# Patient Record
Sex: MALE | Race: WHITE
[De-identification: names, ages, dates, MRNs, and addresses within clinical notes are randomized per-mention and may not be internally consistent; named-entity substitution may affect disease eponyms.]

---

## 2019-07-25 ENCOUNTER — HOSPITAL ENCOUNTER (INPATIENT)
Dept: HOSPITAL 97 - ER | Age: 64
LOS: 1 days | Discharge: HOME | DRG: 291 | End: 2019-07-26
Attending: FAMILY MEDICINE | Admitting: FAMILY MEDICINE
Payer: COMMERCIAL

## 2019-07-25 DIAGNOSIS — J44.9: ICD-10-CM

## 2019-07-25 DIAGNOSIS — I50.21: ICD-10-CM

## 2019-07-25 DIAGNOSIS — Z88.0: ICD-10-CM

## 2019-07-25 DIAGNOSIS — Z87.891: ICD-10-CM

## 2019-07-25 DIAGNOSIS — E11.65: ICD-10-CM

## 2019-07-25 DIAGNOSIS — I35.0: ICD-10-CM

## 2019-07-25 DIAGNOSIS — I11.0: Primary | ICD-10-CM

## 2019-07-25 DIAGNOSIS — I48.91: ICD-10-CM

## 2019-07-25 DIAGNOSIS — E78.2: ICD-10-CM

## 2019-07-25 LAB
ALBUMIN SERPL BCP-MCNC: 3.8 G/DL (ref 3.4–5)
ALP SERPL-CCNC: 75 U/L (ref 45–117)
ALT SERPL W P-5'-P-CCNC: 27 U/L (ref 12–78)
AST SERPL W P-5'-P-CCNC: 14 U/L (ref 15–37)
BUN BLD-MCNC: 17 MG/DL (ref 7–18)
CKMB CREATINE KINASE MB: 1.8 NG/ML (ref 0.3–3.6)
CKMB CREATINE KINASE MB: 1.9 NG/ML (ref 0.3–3.6)
GLUCOSE SERPLBLD-MCNC: 187 MG/DL (ref 74–106)
HCT VFR BLD CALC: 39 % (ref 39.6–49)
INR BLD: 1.07
LYMPHOCYTES # SPEC AUTO: 1.4 K/UL (ref 0.7–4.9)
MAGNESIUM SERPL-MCNC: 2.1 MG/DL (ref 1.8–2.4)
NT-PROBNP SERPL-MCNC: 3183 PG/ML (ref ?–125)
PMV BLD: 10.4 FL (ref 7.6–11.3)
POTASSIUM SERPL-SCNC: 4 MMOL/L (ref 3.5–5.1)
RBC # BLD: 4.13 M/UL (ref 4.33–5.43)
TROPONIN (EMERG DEPT USE ONLY): < 0.02 NG/ML (ref 0–0.04)
TROPONIN I: 0.04 NG/ML (ref 0–0.04)
TROPONIN I: 0.04 NG/ML (ref 0–0.04)
TSH SERPL DL<=0.05 MIU/L-ACNC: 1.96 UIU/ML (ref 0.36–3.74)
UA DIPSTICK W REFLEX MICRO PNL UR: (no result)

## 2019-07-25 PROCEDURE — 71275 CT ANGIOGRAPHY CHEST: CPT

## 2019-07-25 PROCEDURE — 70450 CT HEAD/BRAIN W/O DYE: CPT

## 2019-07-25 PROCEDURE — 82553 CREATINE MB FRACTION: CPT

## 2019-07-25 PROCEDURE — 82550 ASSAY OF CK (CPK): CPT

## 2019-07-25 PROCEDURE — 83690 ASSAY OF LIPASE: CPT

## 2019-07-25 PROCEDURE — 99285 EMERGENCY DEPT VISIT HI MDM: CPT

## 2019-07-25 PROCEDURE — 85610 PROTHROMBIN TIME: CPT

## 2019-07-25 PROCEDURE — 93005 ELECTROCARDIOGRAM TRACING: CPT

## 2019-07-25 PROCEDURE — 80048 BASIC METABOLIC PNL TOTAL CA: CPT

## 2019-07-25 PROCEDURE — 85379 FIBRIN DEGRADATION QUANT: CPT

## 2019-07-25 PROCEDURE — 96372 THER/PROPH/DIAG INJ SC/IM: CPT

## 2019-07-25 PROCEDURE — 83735 ASSAY OF MAGNESIUM: CPT

## 2019-07-25 PROCEDURE — 84443 ASSAY THYROID STIM HORMONE: CPT

## 2019-07-25 PROCEDURE — 71045 X-RAY EXAM CHEST 1 VIEW: CPT

## 2019-07-25 PROCEDURE — 93970 EXTREMITY STUDY: CPT

## 2019-07-25 PROCEDURE — 83880 ASSAY OF NATRIURETIC PEPTIDE: CPT

## 2019-07-25 PROCEDURE — 87088 URINE BACTERIA CULTURE: CPT

## 2019-07-25 PROCEDURE — 85025 COMPLETE CBC W/AUTO DIFF WBC: CPT

## 2019-07-25 PROCEDURE — 87086 URINE CULTURE/COLONY COUNT: CPT

## 2019-07-25 PROCEDURE — 96375 TX/PRO/DX INJ NEW DRUG ADDON: CPT

## 2019-07-25 PROCEDURE — 96374 THER/PROPH/DIAG INJ IV PUSH: CPT

## 2019-07-25 PROCEDURE — 96361 HYDRATE IV INFUSION ADD-ON: CPT

## 2019-07-25 PROCEDURE — 71046 X-RAY EXAM CHEST 2 VIEWS: CPT

## 2019-07-25 PROCEDURE — 82962 GLUCOSE BLOOD TEST: CPT

## 2019-07-25 PROCEDURE — 36415 COLL VENOUS BLD VENIPUNCTURE: CPT

## 2019-07-25 PROCEDURE — 80061 LIPID PANEL: CPT

## 2019-07-25 PROCEDURE — 93306 TTE W/DOPPLER COMPLETE: CPT

## 2019-07-25 PROCEDURE — 80076 HEPATIC FUNCTION PANEL: CPT

## 2019-07-25 PROCEDURE — 81003 URINALYSIS AUTO W/O SCOPE: CPT

## 2019-07-25 PROCEDURE — 84484 ASSAY OF TROPONIN QUANT: CPT

## 2019-07-25 RX ADMIN — HUMAN INSULIN SCH: 100 INJECTION, SOLUTION SUBCUTANEOUS at 21:00

## 2019-07-25 RX ADMIN — HUMAN INSULIN SCH: 100 INJECTION, SOLUTION SUBCUTANEOUS at 12:44

## 2019-07-25 RX ADMIN — METOPROLOL TARTRATE SCH MG: 25 TABLET ORAL at 17:19

## 2019-07-25 RX ADMIN — ALBUMIN (HUMAN) SCH MG: 5 SOLUTION INTRAVENOUS at 16:25

## 2019-07-25 RX ADMIN — Medication SCH ML: at 21:26

## 2019-07-25 RX ADMIN — Medication SCH ML: at 10:43

## 2019-07-25 RX ADMIN — ENOXAPARIN SODIUM SCH MG: 60 INJECTION SUBCUTANEOUS at 21:00

## 2019-07-25 NOTE — ER
Nurse's Notes                                                                                     

 [facility]                                                                 

Name: Sam Romero                                                                           

Age: 63 yrs                                                                                       

Sex: Male                                                                                         

: 1955                                                                                   

MRN: [id number]                                                                                   

Arrival Date: 2019                                                                          

Time: 05:25                                                                                       

Account#: [id number]                                                                            

Bed 5                                                                                             

Private MD:                                                                                       

Diagnosis: Atrial fibrillation and flutter-with rvr;Syncope and collapse-near;Type 2 diabetes     

  mellitus;Cardiomegaly;Unspecified combined systolic (congestive) and diastolic                  

  (congestive) heart failure                                                                      

                                                                                                  

Presentation:                                                                                     

                                                                                             

05:10 Presenting complaint: Patient states: that he woke up and had his morning coffee like     

      normal. He then suddenly became dizzy and just didn't feel well. Also having nausea and     

      vomiting. Does state that for the past month he has been having issues with shortness       

      of breath and no energy. Has appt next week to see Dr Gomez. Transition of care:          

      patient was not received from another setting of care. Onset of symptoms was 2019 at 04:00. Risk Assessment: Do you want to hurt yourself or someone else? Patient       

      reports no desire to harm self or others. Initial Sepsis Screen: Does the patient meet      

      any 2 criteria? HR > 90 bpm. Yes Does the patient have a suspected source of infection?     

      No. Patient's initial sepsis screen is negative. Care prior to arrival: IV initiated.       

      18 GA, in the right antecubital area.                                                       

05:10 Method Of Arrival: EMS: [facility]  

05:10 Acuity: JESSI 3                                                                           fc  

                                                                                                  

Historical:                                                                                       

- Allergies:                                                                                      

05:38 PENICILLINS;                                                                            fc  

- Home Meds:                                                                                      

05:38 metformin 1,000 mg oral tab 1 tab 2 times per day [Active]; Januvia 50 mg oral tab 1    fc  

      tabs once daily [Active]; lovastatin 40 mg Oral tab 1 tab once daily [Active];              

- PMHx:                                                                                           

05:38 Diabetes - NIDDM; High Cholesterol;                                                     fc  

- PSHx:                                                                                           

05:38 None;                                                                                   fc  

                                                                                                  

- Immunization history:: Last tetanus immunization: unknown.                                      

- Social history:: Smoking status: Patient/guardian denies using tobacco,                         

  Patient/guardian denies using alcohol, street drugs.                                            

- Ebola Screening: : Patient negative for fever greater than or equal to 101.5 degrees            

  Fahrenheit, and additional compatible Ebola Virus Disease symptoms Patient denies               

  exposure to infectious person Patient denies travel to an Ebola-affected area in the            

  21 days before illness onset.                                                                   

- Family history:: not pertinent.                                                                 

                                                                                                  

                                                                                                  

Screenin:10 Abuse screen: Denies threats or abuse. Nutritional screening: No deficits noted.        fc  

      Tuberculosis screening: No symptoms or risk factors identified. Fall Risk None              

      identified.                                                                                 

                                                                                                  

Assessment:                                                                                       

05:33 General: Appears in no apparent distress. Behavior is calm, cooperative. Pain: Denies   bb  

      pain. Neuro: Level of Consciousness is awake, alert, obeys commands, Oriented to            

      person, place, time, situation. Cardiovascular: Heart tones S1 S2 present Capillary         

      refill < 3 seconds Patient's skin is warm and dry. Pulses are all present. Edema is         

      absent. Respiratory: Respiratory effort is even, unlabored, Respiratory pattern is          

      regular, symmetrical, Breath sounds are clear bilaterally. GI: Abdomen is                   

      non-distended, Bowel sounds present X 4 quads. Abd is soft and non tender X 4 quads.        

      Derm: Skin is pink, warm \T\ dry. Musculoskeletal: Circulation, motion, and sensation       

      intact.                                                                                     

06:04 Reassessment: pt to CT scan via stretcher with CT tech.                                 bb  

06:28 Reassessment: Patient is alert, oriented x 3, equal unlabored respirations, skin        bb  

      warm/dry/pink. IV site intact, patent, with fluids infusing, awaiting diagnostic            

      results, family at bedside. Pt states dizziness only lasted about 5 minutes.                

06:54 Reassessment: pt returned from CT via stretcher with CT tech. Pt is A\T\O x 4, resp       bb

      unlabored, US tech now at bedside.                                                          

06:56 Reassessment: pt is vomiting Dr Conteh notified.                                      bb  

07:42 Reassessment: Patient appears in no apparent distress at this time. Patient and/or      tw2 

      family updated on plan of care and expected duration. Pain level reassessed. Patient is     

      alert, oriented x 3, equal unlabored respirations, skin warm/dry/pink. Patient states       

      feeling better.                                                                             

08:42 Reassessment: Patient appears in no apparent distress at this time. Patient and/or      tw2 

      family updated on plan of care and expected duration. Pain level reassessed. Patient is     

      alert, oriented x 3, equal unlabored respirations, skin warm/dry/pink.                      

09:45 Reassessment: Patient appears in no apparent distress at this time. Patient and/or      tw2 

      family updated on plan of care and expected duration. Pain level reassessed. Patient is     

      alert, oriented x 3, equal unlabored respirations, skin warm/dry/pink.                      

10:14 Reassessment: SEE [facility] CHARTING AS PT IS ER HOLD AT THIS TIME.                      tw2 

                                                                                                  

Vital Signs:                                                                                      

05:10  / 82; Pulse 116; Resp 16; Temp 98.4(O); Pulse Ox 93% on R/A; Weight 102.06 kg    fc  

      (R); Height 5 ft. 9 in. (175.26 cm) (R); Pain 0/10;                                         

06:30  / 64; Pulse 75; Resp 14 S; Pulse Ox 95% on R/A;                                  bb  

07:52  / 82; Pulse 69; Resp 16; Pulse Ox 99% ;                                          sv  

08:41  / 72; Pulse 63; Resp 17; Pulse Ox 100% on R/A;                                   tw2 

09:45  / 71; Pulse 60; Resp 17; Pulse Ox 99% on R/A; Pain 0/10;                         tw2 

05:10 Body Mass Index 33.23 (102.06 kg, 175.26 cm)                                              

                                                                                                  

ED Course:                                                                                        

05:10 Arm band placed on Patient placed in an exam room, on a stretcher.                        

05:10 Patient has correct armband on for positive identification. Bed in low position. Call   fc  

      light in reach. Side rails up X2. Cardiac monitor on. Pulse ox on. NIBP on.                 

05:10 No provider procedures requiring assistance completed. Maintain EMS IV. Dressing        fc  

      intact. Good blood return noted. Site clean \T\ dry. Gauge \T\ site: 18 gauge to right a/c. 

05:18 EKG done, by ED staff, reviewed by Bunny Conteh MD.                                   fc  

05:25 Patient arrived in ED.                                                                  nader 

05:25 Initial lab(s) drawn, by me, sent to lab.                                               fc  

05:27 Bunny Conteh MD is Attending Physician.                                             nader 

05:31 Triage completed.                                                                       fc  

05:58 Mark Reaves DO is Hospitalizing Provider.                                           nader 

06:11 CT completed. Patient tolerated procedure well. Patient moved to CT via stretcher.      eh  

      Patient moved back from CT.                                                                 

06:13 XRAY Chest (1 view) In Process Unspecified.                                             EDMS

06:20 CT Head Brain wo Cont In Process Unspecified.                                           EDMS

07:50 Basic Metabolic Panel Sent.                                                             sv  

07:50 CBC with Diff Sent.                                                                     sv  

07:50 LFT's Sent.                                                                             sv  

07:50 Magnesium Sent.                                                                         sv  

07:50 NT PRO-BNP Sent.                                                                        sv  

07:52 Awaiting radiology results.                                                             sv  

07:58 Vielka Ren, RN is Primary Nurse.                                                        tw2 

08:15 Echocardiogram with doppler done by Echo tech.                                          tc  

08:56 CT Chest For PE Angio Sent.                                                             sv  

08:56 Echo w/ Doppler Sent.                                                                   sv  

08:56 US Extremity Venous W Compression David Sent.                                             sv  

10:15 Patient admitted, IV remains in place.                                                  tw2 

                                                                                                  

Administered Medications:                                                                         

      Discontinued: NS 0.9% 1000 ml IV at 1 bolus Per protocol; 1000 mL bolus                     

05:49 Drug: NS 0.9% 1000 ml Route: IV; Rate: 1 bolus; Site: right antecubital;                bb  

06:55 Follow up: IV Status: Order to discontinue infusion                                     tw2 

05:53 Drug: Lopressor 25 mg Route: PO;                                                        bb  

07:06 Follow up: Response: No adverse reaction                                                bb  

06:59 CANCELLED (Duplicate Order): Zofran 4 mg IVP once; over 2 minutes                       nader 

07:05 Drug: Phenergan 12.5 mg Route: IVP; Site: right antecubital;                            bb  

07:48 Follow up: Response: No adverse reaction; Nausea is decreased                           sv  

07:06 Drug: Aspirin 162 mg Route: PO;                                                         bb  

07:48 Follow up: Response: No adverse reaction                                                sv  

07:06 Drug: Lovenox 1 mg/kg Route: Sub-Q; Site: abdomen;                                      bb  

07:48 Follow up: Response: No adverse reaction                                                sv  

08:04 Drug: Lasix 20 mg Route: IVP; Site: right antecubital;                                  tw2 

08:54 Follow up: Response: No adverse reaction                                                sv  

                                                                                                  

                                                                                                  

Point of Care Testing:                                                                            

      Blood Glucose:                                                                              

05:28 Blood Glucose: 217 mg/dL;                                                               fc  

      Ranges:                                                                                     

                                                                                                  

Outcome:                                                                                          

05:59 Decision to Hospitalize by Provider.                                                    nader 

10:14 Admitted to ER Hold.  Please see [facility] for further documentation.                    tw2 

10:14 Condition: stable                                                                           

10:14 Instructed on the need for admit.                                                           

13:37 Patient left the ED.                                                                    sv  

                                                                                                  

Signatures:                                                                                       

Dispatcher [employer]                           Johanna Hawkins RN                    RN   sv                                                   

Bunny Conteh MD MD cha Hagler, Ervin                                                                                   

Tania Gonzales RN RN fc Ballard, Brenda, RN                     RN   bb                                                   

Yesy Galvan, EKG Tech               EKG Ttc                                                   

Vielka Ren, JAZZ                          RN   tw2                                                  

                                                                                                  

**************************************************************************************************

## 2019-07-25 NOTE — PN
Date of Progress Note:  07/25/2019



Subjective:  Patient seen and examined.  Chart reviewed and case discussed with 
RN and Dr. Gar.  Patient states his shortness of breath has improved.  The 
dizziness is better as well.  The patient is back in sinus rhythm.



Medications:  List reviewed.



Physical Examination:

Vital Signs:  Pulse is 63, blood pressure 110/72, respirations 17, O2 of 100% 
on room air. 

General:  Awake, alert, and oriented x3.  Elderly male. 

CV:  S1, S2.  Regular rate and rhythm.  The patient does have murmur systolic. 

Respiratory:  Clear to auscultation bilaterally.  No wheezing or stridor. 

Gastrointestinal:  Abdomen is soft, nontender, nondistended.  Positive bowel 
sounds.  No guarding or rigidity. 

Extremities:  No clubbing, cyanosis, or edema.  No calf tenderness. 

Neuro:  Cranial nerves 2 through 12 intact grossly.  No focal neurological 
deficit.  Speech is normal. 

Skin:  No rashes.  Normal skin turgor.



Laboratory Data:  WBC 10.4, H and H 12.9 and 39.  Troponin less than 0.02.  
Magnesium 2.1.  UA is negative.  Echocardiogram shows EF of 30%.  Severe global 
hypokinesis, moderate aortic stenosis 1.3 cm2.  Left ventricular dilation, no 
effusion.  Doppler venous study, no DVT in either lower extremity.  CT angio 
chest shows no pulmonary emboli.  The patient has varying posterior pleural 
effusions, partial atelectasis of each lower lobe, mild failure pattern.  Head 
CT scan negative for any acute abnormalities.



Assessment And Plan:  A 63-year-old male with:

1.   New onset atrial fibrillation with rapid ventricular response, now back in 
sinus rhythm, improved with metoprolol.  Continue with Lovenox.  Appreciate Dr. Gar's input.  He recommends cardiac catheterization.  The patient has 
severe global hypokinesis and aortic stenosis, depressed ejection fraction of 30
%.

2.   Acute congestive heart failure, systolic dysfunction, new onset chest x-
ray and CT angio show pleural effusions and heart failure pattern.  We will 
start on diuretics and continue to monitor I's and O's.  Start on free fluid 
restriction and sodium restriction.

3.   Diabetes mellitus type 2 non-insulin dependent with hyperglycemia.  We 
will continue with sliding scale insulin, monitor Accu-Cheks.

4.   Mixed hyperlipidemia.  We will continue statin.

5.   Chronic obstructive pulmonary disease.  Continue with breathing treatments 
as needed.

6.   Essential hypertension.  Continue on beta-blocker.

7.   Pleural effusion bilateral. Appear small. No need for thoracentesis. 
Secondary to CHF. Continue diuresis. 

7.   Deep vein thrombosis prophylaxis.  Patient is already on Lovenox. 



Plan:  Patient will need an outpatient workup including pulmonary function 
testing and will need to follow closely with Cardiology for his new onset heart 
failure and atrial fibrillation.  We will transition to oral anticoagulants 
upon discharge.





/THAI

DD:  07/25/2019 14:50:21   Voice ID:  [id number]

DT:  07/25/2019 16:58:03   Report ID:  [id number]

DEQUAN

## 2019-07-25 NOTE — XMS REPORT
Patient Summary Document

 Created on:2019



Patient:YAMILE PEOPLES

Sex:Male

:1955

External Reference #:[id number]





Demographics







 Address  [address] [place] [address]

 

 Home Phone  [phone]

 

 Work Phone  [phone]

 

 Email Address  [email or web address]

 

 Preferred Language  Unknown

 

 Marital Status  Unknown

 

 Hoahaoism Affiliation  Unknown

 

 Race  Unknown

 

 Additional Race(s)  Unavailable

 

 Ethnic Group  Unknown









Author







 Organization  [facility]

 

 Address  [address]



   [place] [address]

 

 Phone  [phone]









Care Team Providers







 Name  Role  Phone

 

 SADA DAVIDSON  Unavailable  Unavailable









Problems

This patient has no known problems.



Allergies, Adverse Reactions, Alerts

This patient has no known allergies or adverse reactions.



Medications

This patient has no known medications.



Results







 Test Description  Test Time  Test Comments  Text Results  Atomic Results  
Result Comments









 TISSUE EXAM  2018 19:30:00    Surgical Pathology Report  



       Case: [id number]  



       Authorizing Provider:  aSda Davidson MD  



       Collected:           2018 1538  



       Ordering Location:     [facility]  



       Received:            2018 0839  



                            Services  



         



       Pathologist:           Soren Nation MD  



         



       Specimens:   A) - Polyp, Colon - Right/Ascending,  



       hot snare  



                  B) - Polyp, Colon - Rectum, POLYPS X2,  



       TAKEN BY EMR/HOT SNARE                        A.  



       COLON, RIGHT/ASCENDING, POLYPECTOMY-  



       NONDYSPLASTIC COLON MUCOSA WITH NO SIGNIFICANT  



       DIAGNOSTIC ALTERATIONSB. RECTUM, POLYPECTOMY-  



       SESSILE SERRATED POLYP- HYPERPLASTIC POLYP-  



       SEPARATE FRAGMENT OF RECTAL MUCOSA WITH NO  



       SIGNIFICANT DIAGNOSTIC ALTERATIONS      Signing  



       Pathologist Direct Phone Line:  



       [phone] signed by Soren Nation MD on 3/1/2018 at  7:30 [id number]  



       [id number] colonic polypA. Right/ascending  



       colon colon polyp. B. Rectum polypSpecimen A:  



       Received in formalin labeled "polyp, colon  



       right/ascending" is a 0.7 x 0.5 x 0.2 cm,  



       pink-tan, irregular fragment of soft tissue. The  



       specimen is bisected and entirely submitted in  



       cassette A1.Specimen B: Received in formalin  



       labeled "polyp, colon rectum" are three  



       irregular, pink-tan, polypoid fragments of soft  



       tissue measuring 1.5 x 1.2 x 0.3 cm in aggregate.  



       The largest fragment is bisected, and the  



       specimen is entirely submitted in B1. DB/ew A.  



       Microscopic examination is performed and the  



       findings are incorporated in the diagnostic line.  



       B. There is no cytologic atypia.  









 POCT-GLUCOSE METER  2018 11:41:00    









   Test Item  Value  Reference Range  Comments









 POC-GLUCOSE METER (MARY) (test  177 mg/dL    TESTED AT [facility] [address]



 [id number])      [facility] [address]

## 2019-07-25 NOTE — XMS REPORT
Clinical Summary

 Created on:2019



Patient:Sam Romero

Sex:Male

:1955

External Reference #:[id number]





Demographics







 Address  [address] [place] [address]

 

 Home Phone  [phone]

 

 Email Address  [email or web address]

 

 Preferred Language  English

 

 Marital Status  Unknown

 

 Advent Affiliation  Unknown

 

 Race  White

 

 Ethnic Group  Not  or 









Author







 Organization  [facility]

 

 Address  [address] [place] [address]









Support







 Name  Relationship  Address  Phone

 

 Cindy Romero  Unavailable  Unavailable  [phone]









Care Team Providers







 Name  Role  Phone

 

 Lalo Alva MD  Primary Care Provider  [phone]









Allergies







 Active Allergy  Reactions  Severity  Noted Date  Comments

 

 Penicillins  Other (See Comments)    2018  As a child







Medications







 Medication  Sig  Dispensed  Refills  Start Date  End Date  Status

 

 metFORMIN (GLUCOPHAGE)  Take 1,000 mg by    0      Active



 1000 MG tablet  mouth 2 (two)          



   times daily with          



   breakfast and          



   dinner.          

 

 SITagliptin (JANUVIA)  Take 50 mg by    0      Active



 50 MG tablet  mouth daily.          

 

 lisinopril  Take 5 mg by    0      Active



 (PRINIVIL,ZESTRIL) 5 MG  mouth daily.          



 tablet            

 

 lovastatin (MEVACOR) 40  Take 40 mg by    0      Active



 MG tablet  mouth nightly.          

 

 magnesium oxide  Take 400 mg by    0      Active



 (MAG-OX) 400 mg tablet  mouth daily.          

 

 cinnamon bark 500 mg  Take 500 mg by    0      Active



 capsule  mouth daily.          

 

 cholecalciferol  Take 1,000 Units    0      Active



 (VITAMIN D3) 1,000 unit  by mouth daily.          



 tablet            







Active Problems

Not on file



Social History







 Tobacco Use  Types  Packs/Day  Years Used  Date

 

 Former Smoker        









 Smokeless Tobacco: Never Used      









 Alcohol Use  Drinks/Week  oz/Week  Comments

 

 No      









 Sex Assigned at Birth  Date Recorded

 

 Not on file  









 Job Start Date  Occupation  Industry

 

 Not on file  Not on file  Not on file









 Travel History  Travel Start  Travel End









 No recent travel history available.







Last Filed Vital Signs

Not on file



Plan of Treatment

Not on file



Results

Not on fileafter 2018



Insurance







 Payer  Benefit Plan /  Subscriber ID  Type  Phone  Address



   Group        

 

 BLUE CROSS/BLUE  [employer]  xxxxxxxxxxxxxxx  [employer]  [phone]  [address]







 [employer]  [place]



           [address]









 Guarantor Name  Account Type  Relation to  Date of  Phone  Billing



     Patient  Birth    Address

 

 Sam Romero  Personal/Family  Self  1955  [phone] [address]



 [place]        (Home)  [place] [address]

## 2019-07-25 NOTE — RAD REPORT
EXAM DESCRIPTION:  US - Extrem Venous W Compress David - 7/25/2019 7:17 am

 

CLINICAL HISTORY:   Bilateral leg pain

 

COMPARISON:  None.

 

TECHNIQUE:  Real-time sonographic evaluation of the bilateral lower extremity common femoral, superfi
cial femoral, popliteal and posterior tibial veins was performed.

 

FINDINGS:  Normal compressibility, flow augmentation, phasic flow and spontaneous flow are identified
 in the left and right lower extremity common femoral, superficial femoral, popliteal and posterior t
ibial veins. No intraluminal filling defects seen.

 

IMPRESSION:  No DVT in either lower extremity.

## 2019-07-25 NOTE — ECHO
HEIGHT: 5 ft 9 in   WEIGHT: 125 lb 0 oz   DATE OF STUDY: 7/25/19   REFER DR: 
Bunny Conteh MD

2-DIMENSIONAL: YES

     M.MODE: YES

 DOPPLER: YES

COLOR FLOW: YES



                    TDS:  NO

PORTABLE: YES

 DEFINITY:  NO

BUBBLE STUDY: NO





DIAGNOSIS:  CONGESTIVE HEART FAILURE/ ATRIAL FIBRILLATION



CARDIAC HISTORY:  

CATHERIZATION: NO

SURGERY: NO

PROSTHETIC VALVE: NO

PACEMAKER: NO





MEASUREMENTS (cm)

    DIASTOLIC (NORMALS)                 SYSTOLIC (NORMALS)

IVSd                 0.9 (0.6-1.2)                    LA Diam 3.5 (1.9-4.0)     LVEF       
  30%  

LVIDd               6.7 (3.5-5.7)                        LVIDs      5.7 (2.0-3.5)     %FS  
        14%

LVPWd             1.0 (0.6-1.2)

Ao Diam           2.9 (2.0-3.7)



2 DIMENSIONAL ASSESSMENT:

RIGHT ATRIUM:                   NORMAL

LEFT ATRIUM:       NORMAL



RIGHT VENTRICLE:            NORMAL

LEFT VENTRICLE: DILATED



TRICUSPID VALVE:             NORMAL

MITRAL VALVE:     NORMAL



PULMONIC VALVE:             NORMAL

AORTIC VALVE:     STENOTIC 



PERICARDIAL EFFUSION: NONE

AORTIC ROOT:      NORMAL





LEFT VENTRICULAR WALL MOTION:     SEVERE GLOBAL  HYPOKINESIS.



DOPPLER/COLOR FLOW:     MODERATE AORTIC STENOSIS  AREA 1.3 CENTIMETERS SQUARED. PRESSURE 
GRADIENT 20mmHg.



COMMENTS:      SEVERE GLOBAL HYPOKINESIS. MODERATE AORTIC STENOSIS 1.3 CENTIMETERS 
SQAURED.  LEFT VENTRICULAR DILATATION. NO EFFUSION.



TECHNOLOGIST:   RUEL ROBERTSON

## 2019-07-25 NOTE — RAD REPORT
EXAM DESCRIPTION:  CT - Chest For Pe Angio - 7/25/2019 6:58 am

 

CLINICAL HISTORY:   Weakness, shortness of breath

 

COMPARISON:  Portable chest same date

 

TECHNIQUE:  Dynamically enhanced 3 mm thick images of the chest were obtained during administration o
f approximately 150mL Isovue 370 IV contrast. Coronal and oblique MIP reconstruction images were gene
rated and reviewed. Exam utilizes a protocol to evaluate the pulmonary arterial tree.

 

All CT scans are performed using dose optimization technique as appropriate and may include automated
 exposure control or mA/KV adjustment according to patient size.

 

FINDINGS:  No pulmonary emboli are identified.

 

Aorta assessment is limited on a PE protocol study. No aortic aneurysm. There are aortic calcificatio
ns present but no displaced calcification or other finding suspicious for an acute aortic process. No
 pericardial thickening or effusion. Heart size is upper normal.

 

Small to moderate bilateral layering pleural effusions are present posteriorly. No suspicion for locu
lation. No focal consolidations seen. Partial atelectasis seen in each lower lobe. Overall interstiti
al markings are prominent and the patient could have a mild interstitial edema or infiltrate pattern.
 Early failure would be a consideration. No pneumothorax. No pleural based mass.

 

A few small nonspecific or reactive type mediastinal and hilar lymph nodes are present. No suspicious
 mediastinal process identifiable. No chest wall masses or abnormal axillary lymphadenopathy.

 

Prominent thoracic spine degenerative changes.

 

IMPRESSION:  No pulmonary emboli identified.

 

Small to moderate sized layering posterior pleural effusions. There is minimal partial atelectasis of
 each lower lobe.

 

Prominent interstitial pattern suggesting interstitial edema or infiltrate. Patient may have mild sudheer
lure.

## 2019-07-25 NOTE — EKG
Test Date:    2019-07-25               Test Time:    05:49:51

Technician:   ARTURO                                     

                                                     

MEASUREMENT RESULTS:                                       

Intervals:                                           

Rate:         74                                     

PA:           196                                    

QRSD:         110                                    

QT:           416                                    

QTc:          461                                    

Axis:                                                

P:            59                                     

PA:           196                                    

QRS:          52                                     

T:            84                                     

                                                     

INTERPRETIVE STATEMENTS:                                       

                                                     

Normal sinus rhythm

Low voltage QRS

Incomplete left bundle branch block

T wave abnormality, consider anterior ischemia

Abnormal ECG

Compared to ECG 07/25/2019 05:18:31

Left bundle-branch block now present

T-wave abnormality now present

Possible ischemia now present

Sinus tachycardia no longer present

Fusion complex(es) no longer present

Ventricular premature complex(es) no longer present

First degree AV block no longer present

Myocardial infarct finding no longer present



Electronically Signed On 07-25-19 11:20:57 CDT by John Gar

## 2019-07-25 NOTE — EDPHYS
Physician Documentation                                                                           

 [facility]                                                                 

Name: Sma Romero                                                                           

Age: 63 yrs                                                                                       

Sex: Male                                                                                         

: 1955                                                                                   

MRN: [id number]                                                                                   

Arrival Date: 2019                                                                          

Time: 05:25                                                                                       

Account#: [id number]                                                                            

Bed 5                                                                                             

Private MD:                                                                                       

ED Physician Bunny Conteh                                                                      

HPI:                                                                                              

                                                                                             

05:36 This 63 yrs old  Male presents to ER via EMS with complaints of dizzy and      nader 

      weakness.                                                                                   

05:36 The patient has experienced near-syncope, almost passed out, felt generally weak.       nader 

      Onset: The symptoms/episode began/occurred just prior to arrival, this morning.             

      Duration: This was a single episode, that lasted 3 minute(s). The patient presents with     

      dizziness, feeling faint, generalized weakness, lightheadedness. Onset: The                 

      symptoms/episode began/occurred. Context: occurred at home, occurred while the patient      

      was sitting. Modifying factors: The symptoms are alleviated by nothing, the symptoms        

      are aggravated by nothing. Associated signs and symptoms: Pertinent positives: nausea,      

      shortness of breath, vomiting.                                                              

                                                                                                  

Historical:                                                                                       

- Allergies:                                                                                      

05:38 PENICILLINS;                                                                            fc  

- Home Meds:                                                                                      

05:38 metformin 1,000 mg oral tab 1 tab 2 times per day [Active]; Januvia 50 mg oral tab 1    fc  

      tabs once daily [Active]; lovastatin 40 mg Oral tab 1 tab once daily [Active];              

- PMHx:                                                                                           

05:38 Diabetes - NIDDM; High Cholesterol;                                                     fc  

- PSHx:                                                                                           

05:38 None;                                                                                   fc  

                                                                                                  

- Immunization history:: Last tetanus immunization: unknown.                                      

- Social history:: Smoking status: Patient/guardian denies using tobacco,                         

  Patient/guardian denies using alcohol, street drugs.                                            

- Ebola Screening: : Patient negative for fever greater than or equal to 101.5 degrees            

  Fahrenheit, and additional compatible Ebola Virus Disease symptoms Patient denies               

  exposure to infectious person Patient denies travel to an Ebola-affected area in the            

  21 days before illness onset.                                                                   

- Family history:: not pertinent.                                                                 

                                                                                                  

                                                                                                  

ROS:                                                                                              

05:36 Constitutional: Negative for fever, chills, and weight loss, Eyes: Negative for injury, nader 

      pain, redness, and discharge, ENT: Negative for injury, pain, and discharge, Neck:          

      Negative for injury, pain, and swelling, Back: Negative for injury and pain, :            

      Negative for injury, bleeding, discharge, and swelling, MS/Extremity: Negative for          

      injury and deformity, Skin: Negative for injury, rash, and discoloration, Psych:            

      Negative for depression, anxiety, suicide ideation, homicidal ideation, and                 

      hallucinations, Allergy/Immunology: Negative for hives, rash, and allergies, Endocrine:     

      Negative for neck swelling, polydipsia, polyuria, polyphagia, and marked weight             

      changes, Hematologic/Lymphatic: Negative for swollen nodes, abnormal bleeding, and          

      unusual bruising.                                                                           

05:36 Cardiovascular: Positive for palpitations.                                                  

05:36 Respiratory: Positive for cough, shortness of breath.                                       

05:36 Neuro: Positive for dizziness, near syncope.                                                

                                                                                                  

Exam:                                                                                             

05:36 Constitutional:  This is a well developed, well nourished patient who is awake, alert,  nader 

      and in no acute distress. Head/Face:  Normocephalic, atraumatic. Eyes:  Pupils equal        

      round and reactive to light, extra-ocular motions intact.  Lids and lashes normal.          

      Conjunctiva and sclera are non-icteric and not injected.  Cornea within normal limits.      

      Periorbital areas with no swelling, redness, or edema. ENT:  Nares patent. No nasal         

      discharge, no septal abnormalities noted.  Tympanic membranes are normal and external       

      auditory canals are clear.  Oropharynx with no redness, swelling, or masses, exudates,      

      or evidence of obstruction, uvula midline.  Mucous membranes moist. Neck:  Trachea          

      midline, no thyromegaly or masses palpated, and no cervical lymphadenopathy.  Supple,       

      full range of motion without nuchal rigidity, or vertebral point tenderness.  No            

      Meningismus. Chest/axilla:  Normal chest wall appearance and motion.  Nontender with no     

      deformity.  No lesions are appreciated. Abdomen/GI:  Soft, non-tender, with normal          

      bowel sounds.  No distension or tympany.  No guarding or rebound.  No evidence of           

      tenderness throughout. Back:  No spinal tenderness.  No costovertebral tenderness.          

      Full range of motion. Male :  Normal genitalia with no discharge or lesions. Skin:        

      Warm, dry with normal turgor.  Normal color with no rashes, no lesions, and no evidence     

      of cellulitis. MS/ Extremity:  Pulses equal, no cyanosis.  Neurovascular intact.  Full,     

      normal range of motion. Neuro:  Awake and alert, GCS 15, oriented to person, place,         

      time, and situation.  Cranial nerves II-XII grossly intact.  Motor strength 5/5 in all      

      extremities.  Sensory grossly intact.  Cerebellar exam normal.  Normal gait. Psych:         

      Awake, alert, with orientation to person, place and time.  Behavior, mood, and affect       

      are within normal limits.                                                                   

05:36 Cardiovascular: Rate: tachycardic, Rhythm: regular, Pulses: Pulses are 4+ in bilateral      

      radial, brachial, femoral, popliteal, posterior tibial and and dorsalis pedis               

      arteries.. Heart sounds: normal, Edema: is not appreciated, JVD: is not appreciated.        

05:41 Musculoskeletal/extremity: DVT Exam: No signs of deep vein thrombosis. no pain, no      nader 

      swelling, no tenderness, negative Homans' sign noted on exam, no appreciated bluish         

      discoloration, no erythema, no increased warmth.                                            

                                                                                                  

Vital Signs:                                                                                      

05:10  / 82; Pulse 116; Resp 16; Temp 98.4(O); Pulse Ox 93% on R/A; Weight 102.06 kg    fc  

      (R); Height 5 ft. 9 in. (175.26 cm) (R); Pain 0/10;                                         

06:30  / 64; Pulse 75; Resp 14 S; Pulse Ox 95% on R/A;                                  bb  

07:52  / 82; Pulse 69; Resp 16; Pulse Ox 99% ;                                          sv  

08:41  / 72; Pulse 63; Resp 17; Pulse Ox 100% on R/A;                                   tw2 

09:45  / 71; Pulse 60; Resp 17; Pulse Ox 99% on R/A; Pain 0/10;                         tw2 

05:10 Body Mass Index 33.23 (102.06 kg, 175.26 cm)                                              

                                                                                                  

MDM:                                                                                              

05:27 Patient medically screened.                                                             [facility] 

05:40 Data reviewed: vital signs, nurses notes, lab test result(s), EKG, radiologic studies,  [facility] 

      plain films.                                                                                

                                                                                                  

                                                                                             

05:35 Order name: Basic Metabolic Panel                                                       [facility] 

                                                                                             

05:35 Order name: CBC with Diff                                                               [facility] 

                                                                                             

05:35 Order name: LFT's                                                                       [facility] 

                                                                                             

05:35 Order name: Magnesium                                                                   [facility] 

                                                                                             

05:35 Order name: NT PRO-BNP                                                                  [facility] 

                                                                                             

05:35 Order name: PT-INR; Complete Time: 06:19                                                [facility] 

                                                                                             

05:35 Order name: Troponin (emerg Dept Use Only); Complete Time: 06:52                        [facility] 

                                                                                             

05:35 Order name: TSH; Complete Time: 06:52                                                   [facility] 

                                                                                             

05:35 Order name: Lipase; Complete Time: 06:52                                                [facility] 

                                                                                             

05:35 Order name: Urine Culture                                                               [facility] 

                                                                                             

05:35 Order name: D-Dimer; Complete Time: 06:19                                               [facility] 

                                                                                             

05:37 Order name: Basic Metabolic Panel; Complete Time: 06:52                                 [facility]

                                                                                             

05:37 Order name: CBC with Automated Diff; Complete Time: 06:19                               [facility]

                                                                                             

05:37 Order name: Liver (Hepatic) Function; Complete Time: 06:52                              [facility]

                                                                                             

05:35 Order name: XRAY Chest (1 view)                                                         [facility] 

                                                                                             

05:35 Order name: CT Head Brain wo Cont; Complete Time: 07:57                                 [facility] 

                                                                                             

05:37 Order name: Magnesium; Complete Time: 06:52                                             [facility]

                                                                                             

05:37 Order name: NT PRO-BNP; Complete Time: 06:52                                            [facility]

                                                                                             

06:18 Order name: US Extremity Venous W Compression David                                       [facility] 

                                                                                             

06:18 Order name: CT Chest For PE Angio                                                       [facility] 

                                                                                             

06:54 Order name: Echo w/ Doppler                                                             [facility] 

                                                                                             

07:55 Order name: CT; Complete Time: 07:57                                                    [facility]

                                                                                             

08:03 Order name: US                                                                          [facility]

                                                                                             

09:20 Order name: Urine Dipstick--Ancillary (enter results)                                     

                                                                                             

10:15 Order name: Urine Dipstick-Ancillary                                                    [facility]

                                                                                             

05:35 Order name: EKG; Complete Time: 05:38                                                   [facility] 

                                                                                             

05:35 Order name: Cardiac monitoring; Complete Time: 05:39                                    [facility] 

                                                                                             

05:35 Order name: EKG - Nurse/Tech; Complete Time: 05:39                                      [facility] 

                                                                                             

05:35 Order name: IV Saline Lock; Complete Time: 05:39                                        [facility] 

                                                                                             

05:35 Order name: Labs collected and sent; Complete Time: 05:40                               [facility] 

                                                                                             

05:35 Order name: O2 Per Protocol; Complete Time: 05:40                                       [facility] 

                                                                                             

05:35 Order name: O2 Sat Monitoring; Complete Time: 05:40                                     [facility] 

                                                                                             

05:35 Order name: Urine Dipstick-Ancillary (obtain specimen); Complete Time: 10:15            [facility] 

                                                                                                  

Administered Medications:                                                                         

      Discontinued: NS 0.9% 1000 ml IV at 1 bolus Per protocol; 1000 mL bolus                     

05:49 Drug: NS 0.9% 1000 ml Route: IV; Rate: 1 bolus; Site: right antecubital;                bb  

06:55 Follow up: IV Status: Order to discontinue infusion                                     tw2 

05:53 Drug: Lopressor 25 mg Route: PO;                                                        bb  

07:06 Follow up: Response: No adverse reaction                                                bb  

06:59 CANCELLED (Duplicate Order): Zofran 4 mg IVP once; over 2 minutes                       nader 

07:05 Drug: Phenergan 12.5 mg Route: IVP; Site: right antecubital;                            bb  

07:48 Follow up: Response: No adverse reaction; Nausea is decreased                           sv  

07:06 Drug: Aspirin 162 mg Route: PO;                                                         bb  

07:48 Follow up: Response: No adverse reaction                                                sv  

07:06 Drug: Lovenox 1 mg/kg Route: Sub-Q; Site: abdomen;                                      bb  

07:48 Follow up: Response: No adverse reaction                                                sv  

08:04 Drug: Lasix 20 mg Route: IVP; Site: right antecubital;                                  tw2 

08:54 Follow up: Response: No adverse reaction                                                sv  

                                                                                                  

                                                                                                  

Point of Care Testing:                                                                            

      Blood Glucose:                                                                              

05:28 Blood Glucose: 217 mg/dL;                                                               fc  

      Ranges:                                                                                     

      Critical Glucose Levels:Adult <50 mg/dl or >400 mg/dl  <40 mg/dl or >180 mg/dl       

Disposition:                                                                                      

19 05:59 Hospitalization ordered by Mark Reaves for Inpatient Admission. Preliminary     

  diagnosis are Atrial fibrillation and flutter - with rvr, Syncope and                           

  collapse - near, Type 2 diabetes mellitus, Cardiomegaly, Unspecified combined                   

  systolic (congestive) and diastolic (congestive) heart failure.                                 

- Bed requested for Telemetry/MedSurg (Inpatient).                                                

- Status is Inpatient Admission.                                                              sv  

- Condition is Fair.                                                                              

- Problem is new.                                                                                 

- Symptoms have improved.                                                                         

UTI on Admission? No                                                                              

                                                                                                  

                                                                                                  

                                                                                                  

Signatures:                                                                                       

Dispatcher [employer]                           Johanna Hawkins RN                    RN   Bunny Allen MD MD cha Chretien, Felicia RN                   JAZZ                                                      

Vida Velez RN RN bb Wise, Tara, RN                          RN   tw2                                                  

Rhiannon Lewis                                                   

                                                                                                  

Corrections: (The following items were deleted from the chart)                                    

06:56 05:59 Hospitalization Ordered by Mark Reaves DO for Inpatient Admission. Preliminary  nader 

      diagnosis is Atrial fibrillation and flutter - with rvr; Syncope and collapse - near;       

      Type 2 diabetes mellitus. Bed requested for Telemetry/MedSurg (Inpatient). Status is        

      Inpatient Admission. Condition is Fair. Problem is new. Symptoms have improved. UTI on      

      Admission? No. nader                                                                          

06:59 06:59 Zofran 4 mg IVP once; over 2 minutes ordered. [facility] 

07:58 06:56 2019 05:59 Hospitalization Ordered by Mark Reaves DO for Inpatient        nader 

      Admission. Preliminary diagnosis is Atrial fibrillation and flutter - with rvr; Syncope     

      and collapse - near; Type 2 diabetes mellitus; Cardiomegaly. Bed requested for              

      Telemetry/MedSurg (Inpatient). Status is Inpatient Admission. Condition is Fair.            

      Problem is new. Symptoms have improved. UTI on Admission? No. nader                           

09:51 07:58 2019 05:59 Hospitalization Ordered by Mark Reaves DO for Inpatient        eb  

      Admission. Preliminary diagnosis is Atrial fibrillation and flutter - with rvr; Syncope     

      and collapse - near; Type 2 diabetes mellitus; Cardiomegaly; Unspecified combined           

      systolic (congestive) and diastolic (congestive) heart failure. Bed requested for           

      Telemetry/MedSurg (Inpatient). Status is Inpatient Admission. Condition is Fair.            

      Problem is new. Symptoms have improved. UTI on Admission? No. nader                           

12:50 09:51 2019 05:59 Hospitalization Ordered by Mark Reaves DO for Inpatient        eb  

      Admission. Preliminary diagnosis is Atrial fibrillation and flutter - with rvr; Syncope     

      and collapse - near; Type 2 diabetes mellitus; Cardiomegaly; Unspecified combined           

      systolic (congestive) and diastolic (congestive) heart failure. Bed requested for [facility]      

      ER HOLD. Status is Inpatient Admission. Condition is Fair. Problem is new. Symptoms         

      have improved. UTI on Admission? No. eb                                                     

13:37 12:50 2019 05:59 Hospitalization Ordered by Mark Reaves DO for Inpatient        sv  

      Admission. Preliminary diagnosis is Atrial fibrillation and flutter - with rvr; Syncope     

      and collapse - near; Type 2 diabetes mellitus; Cardiomegaly; Unspecified combined           

      systolic (congestive) and diastolic (congestive) heart failure. Bed requested for           

      Telemetry/MedSurg (Inpatient). Status is Inpatient Admission. Condition is Fair.            

      Problem is new. Symptoms have improved. UTI on Admission? No. eb                            

                                                                                                  

**************************************************************************************************

## 2019-07-25 NOTE — P.HP
Certification for Inpatient


Patient admitted to: Observation


With expected LOS: <2 Midnights


Patient will require the following post-hospital care: None


Practitioner: I am a practitioner with admitting privileges, knowledge of 

patient current condition, hospital course, and medical plan of care.


Services: Services provided to patient in accordance with Admission 

requirements found in Title 42 Section 412.3 of the Code of Federal Regulations





Patient History


Date of Service: 07/25/19


Primary Care Provider: Dr Alva


Reason for admission: Shortness of breath, dizziness


History of Present Illness: 





63-year-old  male presented to the emergency room with shortness of 

breath and dizziness.  Patient with history of diabetes mellitus type 2, 

hyperlipidemia and tobacco abuse.





Patient has been having shortness of breath over the past month.  He recently 

saw pulmonology to evaluate for COPD.  He is to have a pulmonary function test 

here soon.  Today he was had drinking coffee.  He became dizzy and more short 

of breath.  He denied any significant chest pain. He came to the ER for further 

evaluation.





In the ER he was found to be in atrial fibrillation with rate mildly elevated.  

Patient was given IV fluids in the emergency room.  He converted back to normal 

sinus rhythm. CBC, BMP reviewed.  CT head pending at this time.  Patient was 

admitted for observation to further evaluate.  Patient started on metoprolol.





Patient stable at this time.  Patient reported that he was to see Cardiology 

but his 1st appointment was in August.


Home medications list reviewed: Yes





- Past Medical/Surgical History


Diabetic: Yes


-: Diabetes mellitus type 2, non insulin dependent


-: Hyperlipidemia


-: COPD


-: Former tobacco use


Past Surgical History: Patient denies surgical history


Psychosocial/ Personal History: Patient is 





- Family History


Family History: Reviewed- Non-Contributory





- Social History


Smoking Status: Former smoker


Alcohol use: No


CD- Drugs: No


Caffeine use: Yes


Place of Residence: Home





Review of Systems


General: As per HPI


Eyes: Unremarkable


Respiratory: Shortness of Breath, As per HPI


Cardiovascular: Light Headedness, As per HPI


Gastrointestinal: Unremarkable


Genitourinary: Unremarkable


Musculoskeletal: Unremarkable


Integumentary: Unremarkable


Neurological: Unremarkable


Lymphatics: Unremarkable





Physical Examination





- Physical Exam


General: Alert, In no apparent distress, Oriented x3, Cooperative


HEENT: Atraumatic, Normocephalic, PERRLA, Mucous membr. moist/pink


Neck: Supple, No Thyromegaly


Respiratory: Clear to auscultation bilaterally, Normal air movement


Cardiovascular: Normal pulses, Regular rate/rhythm


Gastrointestinal: Normal bowel sounds, Soft and benign, Non-distended, No 

tenderness, No masses, No rebound, No guarding


Musculoskeletal: No erythema, No tenderness, No warmth


Integumentary: No tenderness/swelling, No erythema, No warmth, No cyanosis


Neurological: Normal speech, Normal strength at 5/5 x4 extr, Normal tone, 

Normal affect





- Studies


Laboratory Data (last 24 hrs)





07/25/19 05:25: Lipase 118


07/25/19 05:25: PT 12.6 H, INR 1.07


07/25/19 05:25: WBC 10.4, Hgb 12.9 L, Hct 39.0 L, Plt Count 228








Assessment and Plan





- Plan





Impression:


New onset atrial fibrillation now in normal sinus rhythm


Diabetes mellitus type 2, non-insulin-dependent


Hypertension


Hyperlipidemia


COPD


Former tobacco use





Plan:


New onset atrial fibrillation now in normal sinus rhythm:  Patient will be 

admitted for further evaluation.  Patient converted to normal sinus rhythm in 

the emergency room.  Patient started on metoprolol.  Will start Lovenox at 1 

milligram/kilogram subcu twice daily.  Echocardiogram ordered.  Will consult 

cardiology to further evaluate and assess.  Patient will likely continue with 

metoprolol and require chronic anti coagulation therapy.  Await further 

recommendations from cardiology.  Anticipate discharge in the next 24 to 48 hr 

pending cardiology evaluation and recommendation.


Diabetes mellitus type 2, non-insulin-dependent:  Will monitor Accu-Cheks.  

Will provide sliding scale.  Patient currently takes metformin and Januvia as 

an outpatient.


Hypertension:  Metoprolol will be started.  Will monitor and adjust 

appropriately.


Hyperlipidemia:  Continue with Lipitor.  Check fasting lipid panel.


COPD:  This is being worked up as an outpatient.  Patient to have pulmonary 

function tests.  Will provide Atrovent.  Will discontinue albuterol at this 

time.


Former tobacco use:  Continue with tobacco cessation education


Discharge Plan: Home


Plan to discharge in: 24 Hours





- Advance Directives


Does patient have a Living Will: No


Does patient have a Durable POA for Healthcare: No





- Code Status/Comfort Care


Code Status Assessed: Yes (Patient is full code)


Time Spent Managing Pts Care (In Minutes): 55

## 2019-07-25 NOTE — CON
Date of Consultation:  07/25/2019



Reason For Consultation:  Atrial fibrillation and congestive heart failure.



History Of Present Illness:  Mr. Romero is a 63-year-old male, has a history of diabetes and hyper
lipidemia, came in with weakness and dizziness.  He was found in the emergency room to have mild meredith
estive heart failure by chest x-ray.  He was in atrial fibrillation that has resolved.  He had a D-di
kerri of 701 with a CT angiogram, which was negative for pulmonary embolus.  His BNP was 3183 and gluco
se was 187.  No chest pain reported.  He has been having dyspnea on exertion, dizziness, and weakness
.  Denied PND, orthopnea, pedal edema, palpitations, or syncope.



Allergies:  HE IS ALLERGIC TO PENICILLIN.



Past Medical History:  Includes diabetes and cholesterol.



Medications At Home:  Include Januvia, lovastatin, and metformin.



Physical Examination:

Vital Signs:  Stable, afebrile. 

HEENT:  Negative. 

Neck:  Supple with no bruits, lymphadenopathy, JVD, or thyromegaly. 

Chest:  Revealed rales at both bases. 

Cardiac:  Revealed a 2/6 systolic ejection murmur at the third right intercostal space, radiating to 
the carotid, S4 gallop. 

Abdomen:  Benign. 

Extremities:  Revealed trace edema.



Diagnostic Data:  __________ he had a venous Doppler that was negative.  Echocardiogram showed aortic
 stenosis that is 1.3 cm __________with an ejection fraction of 30% with severe global hypokinesis.



Impression And Plan:  

1.Acute onset of systolic congestive heart failure.

2.Moderate aortic stenosis.

3.New-onset atrial fibrillation that has resolved.

4.Diabetes.

5.Dyslipidemia. 



I think the patient needs to be diuresed first and foremost.  We should add Lasix to his regimen.  He
 is now on metoprolol and Lovenox.  I would avoid ACE inhibitor with his aortic stenosis for now.  Af
ter he diuresis and improves from that standpoint, we should plan for a heart catheterization.  I wou
ld not anticoagulate him __________ p.o. for now.  I will continue his Lovenox.  Depending on how he 
does, we will probably do the catheterization as an outpatient sometime early next week.  His other p
roblems seem to be stable at this point.





NB/MODL

DD:  07/25/2019 13:10:30Voice ID:  [id number]

DT:  07/25/2019 14:31:51Report ID:  [id number]

## 2019-07-25 NOTE — EKG
Test Date:    2019-07-25               Test Time:    05:18:31

Technician:   ARTURO                                     

                                                     

MEASUREMENT RESULTS:                                       

Intervals:                                           

Rate:         111                                    

RI:           214                                    

QRSD:         114                                    

QT:           358                                    

QTc:          486                                    

Axis:                                                

P:            52                                     

RI:           214                                    

QRS:          58                                     

T:            29                                     

                                                     

INTERPRETIVE STATEMENTS:                                       

                                                     

Sinus tachycardia with 1st degree AV block with frequent premature

ventricular complexes and fusion 

complexes

Low voltage QRS

Cannot rule out Anterior infarct, age undetermined

Abnormal ECG

No previous ECG available for comparison



Electronically Signed On 07-25-19 11:20:59 CDT by John Gar

## 2019-07-25 NOTE — RAD REPORT
EXAM DESCRIPTION:  RAD - Chest Single View - 7/25/2019 6:13 am

 

CLINICAL HISTORY:  Dizziness, syncope, shortness of breath

 

COMPARISON:  None.

 

TECHNIQUE:  AP portable chest image was obtained 0611 hours .

 

FINDINGS:  No focal mass or consolidation. Interstitial markings are prominent. Mild cardiomegaly is 
present with vascular engorgement also mild in degree. Trachea is midline. No measurable pleural effu
alexus and no pneumothorax. No acute bony abnormality seen. No acute aortic findings suspected.

 

IMPRESSION:  No focal mass or consolidation.

 

Patient shows a mild failure/ volume overload pattern.

## 2019-07-25 NOTE — RAD REPORT
EXAM DESCRIPTION:  CT - Head Brain Wo Cont - 7/25/2019 6:18 am

 

CLINICAL HISTORY:  Weakness, dizziness, syncope

 

COMPARISON:  None.

 

TECHNIQUE:  Axial 5 mm thick images of the head were obtained without IV contrast.

 

All CT scans are performed using dose optimization technique as appropriate and may include automated
 exposure control or mA/KV adjustment according to patient size.

 

FINDINGS:  No intracranial hemorrhage, mass, edema or shift of mid-line structures. No acute infarcti
on changes seen. No abnormal extra-axial fluid collections. Ventricles are normal.

 

Mastoid air cells and visualized portions of the paranasal sinuses are clear.

 

No acute bony findings.

 

 

IMPRESSION:  Negative non-contrast CT head examination.

## 2019-07-26 LAB
BUN BLD-MCNC: 20 MG/DL (ref 7–18)
GLUCOSE SERPLBLD-MCNC: 136 MG/DL (ref 74–106)
HCT VFR BLD CALC: 39.5 % (ref 39.6–49)
HDLC SERPL-MCNC: 36 MG/DL (ref 40–60)
LDLC SERPL CALC-MCNC: 78 MG/DL (ref ?–130)
LYMPHOCYTES # SPEC AUTO: 1.8 K/UL (ref 0.7–4.9)
MAGNESIUM SERPL-MCNC: 2.4 MG/DL (ref 1.8–2.4)
PMV BLD: 10.6 FL (ref 7.6–11.3)
POTASSIUM SERPL-SCNC: 4.1 MMOL/L (ref 3.5–5.1)
RBC # BLD: 4.2 M/UL (ref 4.33–5.43)

## 2019-07-26 RX ADMIN — HUMAN INSULIN SCH: 100 INJECTION, SOLUTION SUBCUTANEOUS at 07:30

## 2019-07-26 RX ADMIN — METOPROLOL TARTRATE SCH MG: 25 TABLET ORAL at 06:23

## 2019-07-26 RX ADMIN — ENOXAPARIN SODIUM SCH MG: 60 INJECTION SUBCUTANEOUS at 08:22

## 2019-07-26 RX ADMIN — Medication SCH ML: at 08:25

## 2019-07-26 RX ADMIN — ALBUMIN (HUMAN) SCH MG: 5 SOLUTION INTRAVENOUS at 08:23

## 2019-07-26 NOTE — RAD REPORT
EXAM DESCRIPTION:  Tom Tillman (2 Views)7/26/2019 7:55 am

 

CLINICAL HISTORY:  Cough

 

COMPARISON:  July 25th

 

FINDINGS:   The lungs appear clear of acute infiltrate. The heart is mildly enlarged

 

Small bilateral pleural effusions are present

## 2019-07-26 NOTE — DS
Date of Discharge:  07/26/2019



Consultants:  John Gar MD with Cardiology.



Procedures:  None.



Admitting Diagnoses:  

1.New onset atrial fibrillation.

2.Diabetes mellitus type 2 non-insulin-requiring with hyperglycemia.

3.Hypertension essential.

4.Hyperlipidemia.

5.Chronic obstructive pulmonary disease.

6.Obesity.



Discharge Diagnoses:  

1.New onset atrial fibrillation with rapid ventricular response, now back in sinus rhythm.  Continue
 with aspirin.  Patient will be transitioned to Eliquis after heart catheterization or other oral ant
icoagulation by Cardiology.

2.Acute congestive heart failure, systolic dysfunction, EF 30%.

3.Diabetes mellitus type 2 non-insulin dependent with hyperglycemia stable.

4.Mixed hyperlipidemia, on statin.

5.Chronic obstructive pulmonary disease, stable.  Chronic bronchitis.

6.Essential hypertension, stable.

7.Pleural effusion, bilateral, likely related to congestive heart failure.



Hospital Course:  Patient is a 63-year-old male with past medical history of diabetes, hyperlipidemia
, COPD, former smoker, comes in with shortness of breath, dizziness, found to have atrial fibrillatio
n with RVR.  Patient was given metoprolol.  He was converted to sinus rhythm.  He was found to have s
ystolic congestive heart failure.  He had bilateral pleural effusions.  His EF was 30%.  He also had 
moderate aortic stenosis.  Patient will require heart catheterization in the future.  Dr. Gar was
 consulted with Cardiology.  He recommended outpatient cardiac catheterization on Monday, left and ri
[facility].  Patient was started on diuretics and congestive heart failure guidelines.  ACE inhibitor 
was avoided due to aortic stenosis.  Patient was placed on aspirin for his atrial fibrillation now th
at he is back in sinus rhythm.  He will be transitioned to oral anticoagulation after heart catheteri
zation.  He was extensively told to hold his metformin 2 days until after the heart catheterization a
nd to not take his metformin when he goes home either until his heart catheterization procedure is co
mplete and to resume in 48 hours after the procedure.  Overall, patient did well.  His symptoms impro
judi.  He was able to be weaned off supplemental oxygen.  He was saturating 97% on room air.  His repe
at chest x-ray showed small bilateral pleural effusions, improved with diuretics.  Patient was then d
ischarged home in a stable condition.



Activity:  As tolerated.



Medications:  As per medication reconciliation list.  Again, do not start metformin until 2 days afte
r heart catheterization.  Continue to hold metformin.



Followup:  Follow up with primary care physician in 2-3 days.  Follow up with cardiologist, Dr. Michi garcia on Monday.  Return to ER for worsening condition.



Diet:  Diabetic.



Activity:  As tolerated.



Physical Examination:

General:  Awake, alert, oriented x3, obese male. 

CV:  S1, S2.  No murmurs. 

Respiratory:  Moving air well bilaterally. 

Abdomen:  Soft, nontender, nondistended.  Positive bowel sounds. 

Extremities:  No clubbing, cyanosis, or edema. 

Neurologic:  Nonfocal. 



Total time spent discharging the patient was 36 minutes.





/THAI

DD:  07/26/2019 11:57:27Voice ID:  [id number]

DT:  07/26/2019 15:44:46Report ID:  [id number]

## 2019-07-26 NOTE — PN
Date of Progress Note:  07/26/2019



Subjective:  Mr. Romero was admitted yesterday with CHF, aortic stenosis, and atrial fibrillation.
  He has done well on Lasix, beta-blockers, Lovenox, and aspirin.  His atrial fibrillation has not re
curred.  He remained in sinus rhythm.



Objective:  Chest:  Clear. 

Cardiac:  Showed an aortic stenosis murmur.



Plan:  We will plan to send him home today and set him up for a left and right heart catheterization 
as an outpatient next week.  Continue present regimen.  Avoid ACE inhibitors and ARBs for now.  Aldac
tone would be reasonable.





NB/THAI

DD:  07/26/2019 06:48:47Voice ID:  [id number]

DT:  07/26/2019 10:59:01Report ID:  [id number]